# Patient Record
Sex: FEMALE | Race: AMERICAN INDIAN OR ALASKA NATIVE
[De-identification: names, ages, dates, MRNs, and addresses within clinical notes are randomized per-mention and may not be internally consistent; named-entity substitution may affect disease eponyms.]

---

## 2020-07-10 ENCOUNTER — HOSPITAL ENCOUNTER (EMERGENCY)
Dept: HOSPITAL 5 - ED | Age: 35
LOS: 1 days | Discharge: HOME | End: 2020-07-11
Payer: COMMERCIAL

## 2020-07-10 DIAGNOSIS — D64.9: ICD-10-CM

## 2020-07-10 DIAGNOSIS — R06.02: ICD-10-CM

## 2020-07-10 DIAGNOSIS — Z90.49: ICD-10-CM

## 2020-07-10 DIAGNOSIS — Z79.899: ICD-10-CM

## 2020-07-10 DIAGNOSIS — R91.1: ICD-10-CM

## 2020-07-10 DIAGNOSIS — R42: Primary | ICD-10-CM

## 2020-07-10 LAB
ALBUMIN SERPL-MCNC: 3.8 G/DL (ref 3.9–5)
ALT SERPL-CCNC: 8 UNITS/L (ref 7–56)
BASOPHILS # (AUTO): 0 K/MM3 (ref 0–0.1)
BASOPHILS NFR BLD AUTO: 0.2 % (ref 0–1.8)
BUN SERPL-MCNC: 7 MG/DL (ref 7–17)
BUN/CREAT SERPL: 12 %
CALCIUM SERPL-MCNC: 9.5 MG/DL (ref 8.4–10.2)
EOSINOPHIL # BLD AUTO: 0 K/MM3 (ref 0–0.4)
EOSINOPHIL NFR BLD AUTO: 0.6 % (ref 0–4.3)
HCT VFR BLD CALC: 26.7 % (ref 30.3–42.9)
HEMOLYSIS INDEX: 0
HGB BLD-MCNC: 8 GM/DL (ref 10.1–14.3)
INR PPP: 1.03 (ref 0.87–1.13)
LYMPHOCYTES # BLD AUTO: 1.1 K/MM3 (ref 1.2–5.4)
LYMPHOCYTES NFR BLD AUTO: 19.8 % (ref 13.4–35)
MCHC RBC AUTO-ENTMCNC: 30 % (ref 30–34)
MCV RBC AUTO: 67 FL (ref 79–97)
MONOCYTES # (AUTO): 0.8 K/MM3 (ref 0–0.8)
MONOCYTES % (AUTO): 14.4 % (ref 0–7.3)
PLATELET # BLD: 446 K/MM3 (ref 140–440)
RBC # BLD AUTO: 4.01 M/MM3 (ref 3.65–5.03)

## 2020-07-10 PROCEDURE — 85025 COMPLETE CBC W/AUTO DIFF WBC: CPT

## 2020-07-10 PROCEDURE — 96361 HYDRATE IV INFUSION ADD-ON: CPT

## 2020-07-10 PROCEDURE — 80053 COMPREHEN METABOLIC PANEL: CPT

## 2020-07-10 PROCEDURE — 99285 EMERGENCY DEPT VISIT HI MDM: CPT

## 2020-07-10 PROCEDURE — 84703 CHORIONIC GONADOTROPIN ASSAY: CPT

## 2020-07-10 PROCEDURE — 36415 COLL VENOUS BLD VENIPUNCTURE: CPT

## 2020-07-10 PROCEDURE — 93005 ELECTROCARDIOGRAM TRACING: CPT

## 2020-07-10 PROCEDURE — 96360 HYDRATION IV INFUSION INIT: CPT

## 2020-07-10 PROCEDURE — 86900 BLOOD TYPING SEROLOGIC ABO: CPT

## 2020-07-10 PROCEDURE — 83735 ASSAY OF MAGNESIUM: CPT

## 2020-07-10 PROCEDURE — 71275 CT ANGIOGRAPHY CHEST: CPT

## 2020-07-10 PROCEDURE — 85379 FIBRIN DEGRADATION QUANT: CPT

## 2020-07-10 PROCEDURE — 84443 ASSAY THYROID STIM HORMONE: CPT

## 2020-07-10 PROCEDURE — 85610 PROTHROMBIN TIME: CPT

## 2020-07-10 PROCEDURE — 84484 ASSAY OF TROPONIN QUANT: CPT

## 2020-07-10 PROCEDURE — 71046 X-RAY EXAM CHEST 2 VIEWS: CPT

## 2020-07-10 PROCEDURE — 86901 BLOOD TYPING SEROLOGIC RH(D): CPT

## 2020-07-10 PROCEDURE — 86850 RBC ANTIBODY SCREEN: CPT

## 2020-07-10 NOTE — XRAY REPORT
CHEST 2 VIEWS 



INDICATION / CLINICAL INFORMATION:

SOB.



COMPARISON: 

Prior chest radiograph 8/16/2010



FINDINGS:



SUPPORT DEVICES: None.



HEART / MEDIASTINUM: No significant abnormality. 



LUNGS / PLEURA: No significant pulmonary or pleural abnormality. No pneumothorax. 



ADDITIONAL FINDINGS: No significant additional findings.



IMPRESSION:

1. No acute findings. No interval change



Signer Name: Meaghan Brantley MD 

Signed: 7/10/2020 9:58 PM

Workstation Name: [id number]

## 2020-07-10 NOTE — EMERGENCY DEPARTMENT REPORT
HPI





- General


Chief Complaint: Dizziness


Time Seen by Provider: 07/10/20 20:19





- HPI


HPI: 





This is a 34-year-old female presents to the emergency department with a 3 to 4-

day history of some shortness of breath and dizziness/vertigo.  Patient says 

that the dizziness is both the sensation that the room is spinning, as well as 

feeling like she might pass out.  She denies any headache, vision change, 

numbness or paresthesias or any other neurological deficits.  The shortness of 

breath worsens when the patient is trying to sleep.  She denies any fever, chest

pain, cough, wheezing, back pain, lower extremity swelling.  She has not taken 

anything for symptoms prior to presentation.  She has a history of anemia for 

which she takes iron pills.  She denies any tobacco or illicit drug use.  No 

recent travel or sick contacts at home.  No known exposure to anyone with Covid 

19.





ED Past Medical Hx





- Past Medical History


Previous Medical History?: Yes


Additional medical history: Anemia





- Surgical History


Past Surgical History?: Yes


Hx Cholecystectomy: Yes





- Social History


Smoking Status: Never Smoker


Substance Use Type: None





- Medications


Home Medications: 


                                Home Medications











 Medication  Instructions  Recorded  Confirmed  Last Taken  Type


 


Ferrous Sulfate [Feosol] 325 mg PO QDAY #30 tablet 07/11/20  Unknown Rx


 


Meclizine [Antivert] 25 mg PO TID PRN #15 tablet 07/11/20  Unknown Rx














ED Review of Systems


ROS: 


Stated complaint: SOB,DIZZY


Other details as noted in HPI





Comment: All other systems reviewed and negative


Constitutional: denies: chills, fever


Eyes: denies: eye pain, vision change


ENT: denies: ear pain, throat pain


Respiratory: shortness of breath.  denies: cough, wheezing


Cardiovascular: denies: chest pain, palpitations


Gastrointestinal: denies: abdominal pain, vomiting


Genitourinary: denies: dysuria, discharge


Musculoskeletal: denies: back pain, arthralgia


Skin: denies: rash, lesions


Neurological: vertigo, other (dizziness).  denies: headache





Physical Exam





- Physical Exam


Vital Signs: 


                                   Vital Signs











  07/10/20





  20:09


 


Temperature 98.6 F


 


Pulse Rate 90


 


Respiratory 18





Rate 


 


Blood Pressure 88/50


 


O2 Sat by Pulse 100





Oximetry 











Physical Exam: 





GENERAL: The patient is well-developed well-nourished.


HENT: Normocephalic.  Atraumatic.    Patient has moist mucous membranes.


EYES: Extraocular motions are intact.  No nystagmus.


NECK: Supple. Trachea is midline.


CHEST/LUNGS: Clear to auscultation.  No tachypnea or accessory muscle use.  

There is no respiratory distress noted.


HEART/CARDIOVASCULAR: Regular.  There is no tachycardia.  There is no murmur.


ABDOMEN: Abdomen is soft, nontender.  Patient has normal bowel sounds.


SKIN: Skin is warm and dry. 


NEURO: The patient is awake, alert, and oriented.  The patient is cooperative.  

The patient has no focal neurologic deficits.  Normal speech.  Cranial nerves II

 through XII grossly intact.  No pronator drift.  No dysmetria.


MUSCULOSKELETAL: There is no tenderness or deformity.  There is no limitation 

range of motion.  There is no evidence of acute injury.





ED Course


                                   Vital Signs











  07/10/20





  20:09


 


Temperature 98.6 F


 


Pulse Rate 90


 


Respiratory 18





Rate 


 


Blood Pressure 88/50


 


O2 Sat by Pulse 100





Oximetry 














ED Medical Decision Making





- Lab Data


Result diagrams: 


                                 07/10/20 20:27





                                 07/10/20 20:27





- EKG Data


-: EKG Interpreted by Me


EKG shows normal: sinus rhythm, axis, intervals, QRS complexes, ST-T waves


Rate: normal





- EKG Data


When compared to previous EKG there are: previous EKG unavailable


Interpretation: normal EKG





- Radiology Data


Radiology results: report reviewed, image reviewed


interpreted by me: 





Chest x-ray does not show any acute process.  There are no pleural effusions, 

obvious pneumonia and there is no pneumothorax.





CTA CHEST WITH IV CONTRAST INDICATION: Shortness of breath. TECHNIQUE: Axial CT 

images were obtained through the chest after injection of 100 cc Omnipaque 350 

IV contrast. 3 plane MIP reconstructions were produced. All CT scans at this 

location are performed using CT dose reduction for ALARA by means of automated 

exposure control. COMPARISON: None available. FINDINGS: PULMONARY ARTERIES: No 

pulmonary emboli. THORACIC AORTA: No acute abnormality. HEART: Normal. CORONARY 

ARTERIES: No significant calcification. PLEURA: No pleural effusion. No 

pneumothorax. LYMPH NODES: No significant adenopathy. LUNGS: The right upper 

lobe subpleural nodular parenchymal densities measuring 1.7 and 1.4 cm on series

 2 image 24 and 33. ADDITIONAL FINDINGS: None. UPPER ABDOMEN: Indeterminate 

hypodense cystic lesion within the spleen measuring 2.9 cm. Pneumobilia. 

SKELETAL STRUCTURES: No significant osseous abnormality. IMPRESSION: 1. No CT 

evidence for pulmonary embolism. 2. 2 right upper lobe pleural-based small 

nodular opacities. Favor infection/inflammation versus less likely neoplasm 

given patient's young age. Short-term follow-up CT is recommended in 3 months to

 confirm resolution 3 indeterminate splenic lesion likely represents pseudocyst.

 





- Medical Decision Making





This patient presents to the emergency department with a complaint of some 

dizziness/vertigo and shortness of breath.  On examination she does not have any

 focal, motor or sensory deficits and her cranial nerves are intact.  Heart and 

lung sounds are normal to auscultation and the patient does not appear in any 

respiratory or acute distress.  A chest x-ray was done that does not show any 

acute process.  EKG did not show any signs of ST elevation MI or any significant

 dysrhythmia.  Patient's labs show anemia with a hemoglobin of 8 and an elevated

 d-dimer level.  Patient has a history of anemia and has been out of her iron 

supplement.  Secondary to the elevated d-dimer level, the patient had a CT 

angiography of the chest that did not show any evidence for pulmonary embolism. 

 However, incidentally, the patient was found to have 2 right upper lobe 

pleural-based nodules.  I discussed this with the patient so that he can be 

followed by her primary care physician as she will need a follow-up scan or some

 type of imaging study in about 3 months.  Patient was given 2 L of IV fluid 

resuscitation and a dose of Antivert.  She was reevaluated multiple times over 

multiple hours and says she is feeling greatly improved.  The dizziness and 

shortness of breath have both resolved.  The patient was seen ambulatory 

throughout the emergency department and both appears and feels stable.  For all 

these reasons patient appears safe for discharge home at this time.  She has 

been given a prescription for Antivert and instructed to follow-up with her PCP.

  She will return to the ER with any worsening of her symptoms or any acute 

distress.


Critical Care Time: No


Critical care attestation.: 


If time is entered above; I have spent that time in minutes in the direct care 

of this critically ill patient, excluding procedure time.








ED Disposition


Clinical Impression: 


 Vertigo, Shortness of breath, Pulmonary nodules





Anemia


Qualifiers:


 Anemia type: unspecified type Qualified Code(s): D64.9 - Anemia, unspecified





Disposition: DC-01 TO HOME OR SELFCARE


Is pt being admited?: No


Condition: Stable


Instructions:  Vertigo (ED), Dyspnea (ED), Dizziness (ED), Pulmonary Nodules 

(ED)


Additional Instructions: 


Please follow-up with your primary care physician in the next few days.  Return 

to the emergency department with any worsening of your symptoms or any acute 

distress.





I am setting you up for a lower extremity venous Doppler ultrasound to evaluate 

the bumps/lumps felt on your legs.  If positive for blood clots, you will be 

redirected to the emergency department.  If negative, proceed with outpatient 

follow-up with your primary care physician.


Prescriptions: 


Meclizine [Antivert] 25 mg PO TID PRN #15 tablet


 PRN Reason: Vertigo


Ferrous Sulfate [Feosol] 325 mg PO QDAY #30 tablet


Referrals: 


PCP, Your [Other] - 2-3 Days


Time of Disposition: 02:16

## 2020-07-10 NOTE — EVENT NOTE
ED Screening Note


ED Screening Note: 


SOB, lightheadedness, fatigue that began three days ago


hx of anemia, never had transfusion 


no n/v/d


no cough


no fever


no CP 


conjunctiva is very pale 


hx of heavy menstrual cycle, currently on cycle 


hypotensive, improved upon repeat





This initial assessment/diagnostic orders/clinical plan/treatment(s) is/are 

subject to change based on patients health status, clinical progression and re-

assessment by fellow clinical providers in the ED. Further treatment and workup 

at subsequent clinical providers discretion. Patient/guardian urged not to elope

from the ED as their condition may be serious if not clinically assessed and 

managed. 





Initial orders include: 


labs








8:22 PM spoke with Alexys, charge nurse advised pts blood pressure and paleness, 

states she is looking for a room

## 2020-07-11 VITALS — SYSTOLIC BLOOD PRESSURE: 98 MMHG | DIASTOLIC BLOOD PRESSURE: 55 MMHG

## 2020-07-11 NOTE — CAT SCAN REPORT
CTA CHEST WITH IV CONTRAST



INDICATION:

Shortness of breath.



TECHNIQUE:

Axial CT images were obtained through the chest after injection of 100 cc Omnipaque 350 IV contrast. 
3 plane MIP reconstructions were produced. All CT scans at this location are performed using CT dose 
reduction for ALARA by means of automated exposure control. 



COMPARISON:

None available.



FINDINGS:

PULMONARY ARTERIES: No pulmonary emboli.

THORACIC AORTA: No acute abnormality.

HEART: Normal.

CORONARY ARTERIES: No significant calcification.

PLEURA: No pleural effusion. No pneumothorax.

LYMPH NODES: No significant adenopathy.

LUNGS: The right upper lobe subpleural nodular parenchymal densities measuring 1.7 and 1.4 cm on seri
es 2 image 24 and 33. 



ADDITIONAL FINDINGS: None.



UPPER ABDOMEN: Indeterminate hypodense cystic lesion within the spleen measuring 2.9 cm. Pneumobilia.




SKELETAL STRUCTURES: No significant osseous abnormality.



IMPRESSION:

1. No CT evidence for pulmonary embolism. 

2. 2 right upper lobe pleural-based small nodular opacities. Favor infection/inflammation versus less
 likely neoplasm given patient's young age. Short-term follow-up CT is recommended in 3 months to con
firm resolution 

3 indeterminate splenic lesion likely represents pseudocyst.



Signer Name: John Marti MD 

Signed: 7/11/2020 12:46 AM

Workstation Name: [id number]